# Patient Record
Sex: MALE | RURAL
[De-identification: names, ages, dates, MRNs, and addresses within clinical notes are randomized per-mention and may not be internally consistent; named-entity substitution may affect disease eponyms.]

---

## 2022-10-13 ENCOUNTER — APPOINTMENT (OUTPATIENT)
Dept: RURAL CLINIC 10 | Age: 77
Setting detail: DERMATOLOGY
End: 2022-10-13

## 2022-10-13 DIAGNOSIS — L57.0 ACTINIC KERATOSIS: ICD-10-CM

## 2022-10-13 DIAGNOSIS — L57.8 OTHER SKIN CHANGES DUE TO CHRONIC EXPOSURE TO NONIONIZING RADIATION: ICD-10-CM

## 2022-10-13 DIAGNOSIS — Z71.89 OTHER SPECIFIED COUNSELING: ICD-10-CM

## 2022-10-13 DIAGNOSIS — D485 NEOPLASM OF UNCERTAIN BEHAVIOR OF SKIN: ICD-10-CM

## 2022-10-13 PROBLEM — D48.5 NEOPLASM OF UNCERTAIN BEHAVIOR OF SKIN: Status: ACTIVE | Noted: 2022-10-13

## 2022-10-13 PROCEDURE — OTHER BIOPSY BY SHAVE METHOD: OTHER

## 2022-10-13 PROCEDURE — 11102 TANGNTL BX SKIN SINGLE LES: CPT

## 2022-10-13 PROCEDURE — 99203 OFFICE O/P NEW LOW 30 MIN: CPT | Mod: 25

## 2022-10-13 PROCEDURE — OTHER LIQUID NITROGEN: OTHER

## 2022-10-13 PROCEDURE — OTHER COUNSELING: OTHER

## 2022-10-13 PROCEDURE — 17000 DESTRUCT PREMALG LESION: CPT | Mod: 59

## 2022-10-13 PROCEDURE — OTHER MIPS QUALITY: OTHER

## 2022-10-13 ASSESSMENT — LOCATION ZONE DERM
LOCATION ZONE: LIP
LOCATION ZONE: EAR

## 2022-10-13 ASSESSMENT — LOCATION SIMPLE DESCRIPTION DERM
LOCATION SIMPLE: LEFT LIP
LOCATION SIMPLE: LEFT EAR

## 2022-10-13 ASSESSMENT — LOCATION DETAILED DESCRIPTION DERM
LOCATION DETAILED: LEFT SUPERIOR HELIX
LOCATION DETAILED: LEFT UPPER CUTANEOUS LIP

## 2022-10-13 NOTE — PROCEDURE: BIOPSY BY SHAVE METHOD
Size Of Lesion In Cm: 0
Validate That Anesthesia Is Not 0: No
Detail Level: Detailed
Silver Nitrate Text: The wound bed was treated with silver nitrate after the biopsy was performed.
Was A Bandage Applied: Yes
Information: Selecting Yes will display possible errors in your note based on the variables you have selected. This validation is only offered as a suggestion for you. PLEASE NOTE THAT THE VALIDATION TEXT WILL BE REMOVED WHEN YOU FINALIZE YOUR NOTE. IF YOU WANT TO FAX A PRELIMINARY NOTE YOU WILL NEED TO TOGGLE THIS TO 'NO' IF YOU DO NOT WANT IT IN YOUR FAXED NOTE.
Cryotherapy Text: The wound bed was treated with cryotherapy after the biopsy was performed.
Notification Instructions: Patient will be notified of biopsy results. However, patient instructed to call the office if not contacted within 2 weeks.
Hemostasis: Drysol
Type Of Destruction Used: Curettage
Electrodesiccation And Curettage Text: The wound bed was treated with electrodesiccation and curettage after the biopsy was performed.
Dressing: bandage
Anticipated Plan (Based On Presumed Biopsy Results): Mohs if malignant
Anesthesia Volume In Cc (Will Not Render If 0): 0.5
Consent: Written consent was obtained and risks were reviewed including but not limited to scarring, infection, bleeding, scabbing, incomplete removal, nerve damage and allergy to anesthesia.
Electrodesiccation Text: The wound bed was treated with electrodesiccation after the biopsy was performed.
Depth Of Biopsy: epidermis
Post-Care Instructions: I reviewed with the patient in detail post-care instructions. Patient is to keep the biopsy site dry overnight, and then apply bacitracin twice daily until healed. Patient may apply hydrogen peroxide soaks to remove any crusting.
Biopsy Method: Dermablade
Anesthesia Type: 1% lidocaine with epinephrine
Curettage Text: The wound bed was treated with curettage after the biopsy was performed.
Wound Care: Petrolatum
Billing Type: Third-Party Bill
Biopsy Type: H and E

## 2022-10-13 NOTE — PROCEDURE: LIQUID NITROGEN
Number Of Freeze-Thaw Cycles: 2 freeze-thaw cycles
Render Post-Care Instructions In Note?: no
Duration Of Freeze Thaw-Cycle (Seconds): 3
Show Applicator Variable?: Yes
Application Tool (Optional): Liquid Nitrogen Sprayer
Post-Care Instructions: I reviewed with the patient in detail post-care instructions. Patient is to wear sunprotection, and avoid picking at any of the treated lesions. Pt may apply Vaseline to crusted or scabbing areas.
Consent: The patient's consent was obtained including but not limited to risks of crusting, scabbing, blistering, scarring, darker or lighter pigmentary change, recurrence, incomplete removal and infection.
Detail Level: Detailed
Aperture Size (Optional): D

## 2022-11-28 ENCOUNTER — APPOINTMENT (OUTPATIENT)
Dept: RURAL CLINIC 10 | Age: 77
Setting detail: DERMATOLOGY
End: 2022-11-28

## 2022-11-28 DIAGNOSIS — D18.0 HEMANGIOMA: ICD-10-CM

## 2022-11-28 DIAGNOSIS — L81.4 OTHER MELANIN HYPERPIGMENTATION: ICD-10-CM

## 2022-11-28 DIAGNOSIS — Z71.89 OTHER SPECIFIED COUNSELING: ICD-10-CM

## 2022-11-28 DIAGNOSIS — B07.8 OTHER VIRAL WARTS: ICD-10-CM

## 2022-11-28 DIAGNOSIS — L57.0 ACTINIC KERATOSIS: ICD-10-CM

## 2022-11-28 DIAGNOSIS — L82.1 OTHER SEBORRHEIC KERATOSIS: ICD-10-CM

## 2022-11-28 DIAGNOSIS — D17 BENIGN LIPOMATOUS NEOPLASM: ICD-10-CM

## 2022-11-28 DIAGNOSIS — L57.8 OTHER SKIN CHANGES DUE TO CHRONIC EXPOSURE TO NONIONIZING RADIATION: ICD-10-CM

## 2022-11-28 PROBLEM — D18.01 HEMANGIOMA OF SKIN AND SUBCUTANEOUS TISSUE: Status: ACTIVE | Noted: 2022-11-28

## 2022-11-28 PROBLEM — D17.1 BENIGN LIPOMATOUS NEOPLASM OF SKIN AND SUBCUTANEOUS TISSUE OF TRUNK: Status: ACTIVE | Noted: 2022-11-28

## 2022-11-28 PROCEDURE — OTHER MIPS QUALITY: OTHER

## 2022-11-28 PROCEDURE — 17110 DESTRUCT B9 LESION 1-14: CPT

## 2022-11-28 PROCEDURE — 17000 DESTRUCT PREMALG LESION: CPT | Mod: 59

## 2022-11-28 PROCEDURE — OTHER EDUCATIONAL RESOURCES PROVIDED: OTHER

## 2022-11-28 PROCEDURE — OTHER LIQUID NITROGEN: OTHER

## 2022-11-28 PROCEDURE — OTHER COUNSELING: OTHER

## 2022-11-28 PROCEDURE — 99213 OFFICE O/P EST LOW 20 MIN: CPT | Mod: 25

## 2022-11-28 ASSESSMENT — LOCATION ZONE DERM
LOCATION ZONE: LIP
LOCATION ZONE: TRUNK
LOCATION ZONE: EAR

## 2022-11-28 ASSESSMENT — LOCATION SIMPLE DESCRIPTION DERM
LOCATION SIMPLE: RIGHT EAR
LOCATION SIMPLE: LEFT LIP
LOCATION SIMPLE: RIGHT UPPER BACK
LOCATION SIMPLE: ABDOMEN
LOCATION SIMPLE: CHEST
LOCATION SIMPLE: LEFT LOWER BACK

## 2022-11-28 ASSESSMENT — LOCATION DETAILED DESCRIPTION DERM
LOCATION DETAILED: RIGHT LATERAL SUPERIOR CHEST
LOCATION DETAILED: STERNUM
LOCATION DETAILED: LEFT INFERIOR MEDIAL MIDBACK
LOCATION DETAILED: LEFT UPPER CUTANEOUS LIP
LOCATION DETAILED: RIGHT SUPERIOR HELIX
LOCATION DETAILED: RIGHT SUPERIOR MEDIAL UPPER BACK
LOCATION DETAILED: LEFT LATERAL ABDOMEN

## 2022-11-28 NOTE — PROCEDURE: LIQUID NITROGEN
Post-Care Instructions: I reviewed with the patient in detail post-care instructions. Patient is to wear sunprotection, and avoid picking at any of the treated lesions. Pt may apply Vaseline to crusted or scabbing areas.
Show Aperture Variable?: Yes
Number Of Freeze-Thaw Cycles: 2 freeze-thaw cycles
Duration Of Freeze Thaw-Cycle (Seconds): 3
Consent: The patient's consent was obtained including but not limited to risks of crusting, scabbing, blistering, scarring, darker or lighter pigmentary change, recurrence, incomplete removal and infection.
Render Post-Care Instructions In Note?: no
Detail Level: Detailed
Spray Paint Text: The liquid nitrogen was applied to the skin utilizing a spray paint frosting technique.
Medical Necessity Clause: This procedure was medically necessary because the lesions that were treated were:
Aperture Size (Optional): D
Duration Of Freeze Thaw-Cycle (Seconds): 4
Medical Necessity Information: It is in your best interest to select a reason for this procedure from the list below. All of these items fulfill various CMS LCD requirements except the new and changing color options.
Application Tool (Optional): Liquid Nitrogen Sprayer
Number Of Freeze-Thaw Cycles: 3 freeze-thaw cycles